# Patient Record
Sex: MALE | Race: OTHER | HISPANIC OR LATINO
[De-identification: names, ages, dates, MRNs, and addresses within clinical notes are randomized per-mention and may not be internally consistent; named-entity substitution may affect disease eponyms.]

---

## 2017-05-11 ENCOUNTER — RX RENEWAL (OUTPATIENT)
Age: 37
End: 2017-05-11

## 2018-06-25 ENCOUNTER — NON-APPOINTMENT (OUTPATIENT)
Age: 38
End: 2018-06-25

## 2018-06-25 ENCOUNTER — APPOINTMENT (OUTPATIENT)
Dept: FAMILY MEDICINE | Facility: CLINIC | Age: 38
End: 2018-06-25
Payer: COMMERCIAL

## 2018-06-25 ENCOUNTER — LABORATORY RESULT (OUTPATIENT)
Age: 38
End: 2018-06-25

## 2018-06-25 VITALS
HEART RATE: 73 BPM | WEIGHT: 245 LBS | SYSTOLIC BLOOD PRESSURE: 128 MMHG | OXYGEN SATURATION: 99 % | HEIGHT: 76 IN | DIASTOLIC BLOOD PRESSURE: 86 MMHG | TEMPERATURE: 98.2 F | BODY MASS INDEX: 29.83 KG/M2

## 2018-06-25 DIAGNOSIS — R53.83 OTHER FATIGUE: ICD-10-CM

## 2018-06-25 PROCEDURE — 36415 COLL VENOUS BLD VENIPUNCTURE: CPT

## 2018-06-25 PROCEDURE — 99214 OFFICE O/P EST MOD 30 MIN: CPT | Mod: 25

## 2018-06-26 ENCOUNTER — TRANSCRIPTION ENCOUNTER (OUTPATIENT)
Age: 38
End: 2018-06-26

## 2018-06-27 LAB
25(OH)D3 SERPL-MCNC: 26.1 NG/ML
A PHAGOCYTOPH IGG TITR SER IF: NORMAL TITER
ALBUMIN SERPL ELPH-MCNC: 5.2 G/DL
ALP BLD-CCNC: 57 U/L
ALT SERPL-CCNC: 41 U/L
ANION GAP SERPL CALC-SCNC: 15 MMOL/L
AST SERPL-CCNC: 29 U/L
B BURGDOR AB SER QL IA: NEGATIVE
B MICROTI IGG TITR SER: NORMAL TITER
BASOPHILS # BLD AUTO: 0.02 K/UL
BASOPHILS NFR BLD AUTO: 0.5 %
BILIRUB SERPL-MCNC: 0.5 MG/DL
BUN SERPL-MCNC: 24 MG/DL
CALCIUM SERPL-MCNC: 10.4 MG/DL
CHLORIDE SERPL-SCNC: 105 MMOL/L
CO2 SERPL-SCNC: 23 MMOL/L
CREAT SERPL-MCNC: 1.36 MG/DL
E CHAFFEENSIS IGG TITR SER IF: NORMAL TITER
EOSINOPHIL # BLD AUTO: 0.1 K/UL
EOSINOPHIL NFR BLD AUTO: 2.3 %
GLUCOSE SERPL-MCNC: 102 MG/DL
HCT VFR BLD CALC: 49.8 %
HGB BLD-MCNC: 16 G/DL
IMM GRANULOCYTES NFR BLD AUTO: 0.5 %
LYMPHOCYTES # BLD AUTO: 1.61 K/UL
LYMPHOCYTES NFR BLD AUTO: 36.3 %
MAN DIFF?: NORMAL
MCHC RBC-ENTMCNC: 29.5 PG
MCHC RBC-ENTMCNC: 32.1 GM/DL
MCV RBC AUTO: 91.7 FL
MONOCYTES # BLD AUTO: 0.29 K/UL
MONOCYTES NFR BLD AUTO: 6.5 %
NEUTROPHILS # BLD AUTO: 2.39 K/UL
NEUTROPHILS NFR BLD AUTO: 53.9 %
PLATELET # BLD AUTO: 214 K/UL
POTASSIUM SERPL-SCNC: 4.9 MMOL/L
PROT SERPL-MCNC: 7.9 G/DL
RBC # BLD: 5.43 M/UL
RBC # FLD: 14.7 %
SODIUM SERPL-SCNC: 143 MMOL/L
TSH SERPL-ACNC: 1.57 UIU/ML
WBC # FLD AUTO: 4.43 K/UL

## 2018-07-03 LAB

## 2018-10-04 ENCOUNTER — APPOINTMENT (OUTPATIENT)
Dept: FAMILY MEDICINE | Facility: CLINIC | Age: 38
End: 2018-10-04
Payer: COMMERCIAL

## 2018-10-04 VITALS — SYSTOLIC BLOOD PRESSURE: 120 MMHG | DIASTOLIC BLOOD PRESSURE: 80 MMHG

## 2018-10-04 VITALS
BODY MASS INDEX: 30.44 KG/M2 | OXYGEN SATURATION: 98 % | SYSTOLIC BLOOD PRESSURE: 134 MMHG | HEART RATE: 80 BPM | DIASTOLIC BLOOD PRESSURE: 84 MMHG | HEIGHT: 76 IN | WEIGHT: 250 LBS

## 2018-10-04 PROCEDURE — 99395 PREV VISIT EST AGE 18-39: CPT

## 2018-10-04 RX ORDER — DOXYCYCLINE HYCLATE 100 MG/1
100 CAPSULE ORAL
Qty: 2 | Refills: 0 | Status: DISCONTINUED | COMMUNITY
Start: 2018-06-25 | End: 2018-10-04

## 2018-10-07 NOTE — HISTORY OF PRESENT ILLNESS
[FreeTextEntry1] : Patient here for CPE.  [de-identified] : 37 year old male to establish care\par fishes night\par has muliple tick bites\par  2 kids 15 and 14\par \par takes omeprazole as needed\par very rarely\par \par

## 2018-11-30 ENCOUNTER — MEDICATION RENEWAL (OUTPATIENT)
Age: 38
End: 2018-11-30

## 2018-11-30 ENCOUNTER — TRANSCRIPTION ENCOUNTER (OUTPATIENT)
Age: 38
End: 2018-11-30

## 2019-05-04 ENCOUNTER — TRANSCRIPTION ENCOUNTER (OUTPATIENT)
Age: 39
End: 2019-05-04

## 2020-05-01 ENCOUNTER — RX RENEWAL (OUTPATIENT)
Age: 40
End: 2020-05-01

## 2021-01-12 ENCOUNTER — APPOINTMENT (OUTPATIENT)
Dept: FAMILY MEDICINE | Facility: CLINIC | Age: 41
End: 2021-01-12
Payer: COMMERCIAL

## 2021-01-12 ENCOUNTER — NON-APPOINTMENT (OUTPATIENT)
Age: 41
End: 2021-01-12

## 2021-01-12 VITALS
HEART RATE: 90 BPM | BODY MASS INDEX: 30.44 KG/M2 | OXYGEN SATURATION: 98 % | SYSTOLIC BLOOD PRESSURE: 132 MMHG | WEIGHT: 250 LBS | TEMPERATURE: 97.8 F | HEIGHT: 76 IN | DIASTOLIC BLOOD PRESSURE: 88 MMHG

## 2021-01-12 VITALS — SYSTOLIC BLOOD PRESSURE: 124 MMHG | DIASTOLIC BLOOD PRESSURE: 84 MMHG
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
## 2021-01-12 DIAGNOSIS — G25.81 RESTLESS LEGS SYNDROME: ICD-10-CM

## 2021-01-12 DIAGNOSIS — Z13.89 ENCOUNTER FOR SCREENING FOR OTHER DISORDER: ICD-10-CM

## 2021-01-12 DIAGNOSIS — R06.83 SNORING: ICD-10-CM

## 2021-01-12 DIAGNOSIS — Z13.29 ENCOUNTER FOR SCREENING FOR OTHER SUSPECTED ENDOCRINE DISORDER: ICD-10-CM

## 2021-01-12 DIAGNOSIS — R21 RASH AND OTHER NONSPECIFIC SKIN ERUPTION: ICD-10-CM

## 2021-01-12 DIAGNOSIS — R74.8 ABNORMAL LEVELS OF OTHER SERUM ENZYMES: ICD-10-CM

## 2021-01-12 DIAGNOSIS — M25.50 PAIN IN UNSPECIFIED JOINT: ICD-10-CM

## 2021-01-12 DIAGNOSIS — Z12.5 ENCOUNTER FOR SCREENING FOR MALIGNANT NEOPLASM OF PROSTATE: ICD-10-CM

## 2021-01-12 PROCEDURE — 99072 ADDL SUPL MATRL&STAF TM PHE: CPT

## 2021-01-12 PROCEDURE — 93000 ELECTROCARDIOGRAM COMPLETE: CPT | Mod: 59

## 2021-01-12 PROCEDURE — 99396 PREV VISIT EST AGE 40-64: CPT | Mod: 25

## 2021-01-12 RX ORDER — DOXYCYCLINE HYCLATE 100 MG/1
100 CAPSULE ORAL
Qty: 2 | Refills: 3 | Status: COMPLETED | COMMUNITY
Start: 2018-10-04 | End: 2021-01-12

## 2021-01-12 NOTE — HISTORY OF PRESENT ILLNESS
[FreeTextEntry1] : CPE [de-identified] : MITCH is a 40 year male here for CPE. Mood is good. Takes Omeprazole prn. States he was taking Vitamin D 5,000 units. Notes he has been improving on increasing his water intake to 60 oz a day. Reports that his wife thinks he has restless leg syndrome because he moves around a lot. He states it is because they have four cats that walk all over him while he is sleeping. Notes that he is a snorer. Denies fatigue. Bowel habits are normal.

## 2021-01-12 NOTE — END OF VISIT
[FreeTextEntry3] : Medical record entries made by the scribe today today, were at my direction and personally dictated to them by me, Dr. Devorah Virgen on Jan 12, 2021. I have reviewed the chart and agree that the record accurately reflects my personal performance of the history, physical exam, assessment, and plan.\par

## 2021-01-12 NOTE — HEALTH RISK ASSESSMENT
[Good] : ~his/her~  mood as  good [No] : In the past 12 months have you used drugs other than those required for medical reasons? No [0] : 2) Feeling down, depressed, or hopeless: Not at all (0) [None] : None [With Family] : lives with family [Employed] : employed [] :  [Feels Safe at Home] : Feels safe at home [] : No [[id number]] : 0

## 2021-01-12 NOTE — ADDENDUM
[FreeTextEntry1] : I, Brynn Carolina acting as a scribe for Dr. Devorah Virgen on Jan 12, 2021  at 4:05 PM\par

## 2021-01-12 NOTE — PLAN
[FreeTextEntry1] : \par - Blood work script \par - Blood work from 2018 reviewed \par - Low Vitamin D in 2018- Will check labs. If Vit D low will recommend ergocalciferol 50,000 units once a week \par - Last cholesterol in 2018 was 214- Encouraged 150 min of exercise a week. I adv low fat/low cholesterol diet and weight loss. \par - Elevated Creatine Kinase: Increase hydration at least 70 oz!!!\par - Sleep study referral provided \par - F/u 1 year

## 2021-02-13 DIAGNOSIS — Z01.818 ENCOUNTER FOR OTHER PREPROCEDURAL EXAMINATION: ICD-10-CM

## 2021-02-14 ENCOUNTER — APPOINTMENT (OUTPATIENT)
Dept: DISASTER EMERGENCY | Facility: CLINIC | Age: 41
End: 2021-02-14

## 2021-02-23 ENCOUNTER — APPOINTMENT (OUTPATIENT)
Dept: DISASTER EMERGENCY | Facility: CLINIC | Age: 41
End: 2021-02-23

## 2021-02-24 ENCOUNTER — APPOINTMENT (OUTPATIENT)
Dept: DISASTER EMERGENCY | Facility: CLINIC | Age: 41
End: 2021-02-24

## 2021-02-25 LAB — SARS-COV-2 N GENE NPH QL NAA+PROBE: NOT DETECTED

## 2021-02-28 ENCOUNTER — OUTPATIENT (OUTPATIENT)
Dept: OUTPATIENT SERVICES | Facility: HOSPITAL | Age: 41
LOS: 1 days | End: 2021-02-28
Payer: COMMERCIAL

## 2021-02-28 DIAGNOSIS — G47.33 OBSTRUCTIVE SLEEP APNEA (ADULT) (PEDIATRIC): ICD-10-CM

## 2021-02-28 PROCEDURE — 95810 POLYSOM 6/> YRS 4/> PARAM: CPT | Mod: 26

## 2021-02-28 PROCEDURE — 95810 POLYSOM 6/> YRS 4/> PARAM: CPT

## 2021-03-08 ENCOUNTER — TRANSCRIPTION ENCOUNTER (OUTPATIENT)
Age: 41
End: 2021-03-08

## 2021-03-23 ENCOUNTER — APPOINTMENT (OUTPATIENT)
Dept: DISASTER EMERGENCY | Facility: CLINIC | Age: 41
End: 2021-03-23

## 2021-03-24 LAB — SARS-COV-2 N GENE NPH QL NAA+PROBE: NOT DETECTED

## 2021-03-27 ENCOUNTER — OUTPATIENT (OUTPATIENT)
Dept: OUTPATIENT SERVICES | Facility: HOSPITAL | Age: 41
LOS: 1 days | End: 2021-03-27
Payer: COMMERCIAL

## 2021-03-27 DIAGNOSIS — G47.33 OBSTRUCTIVE SLEEP APNEA (ADULT) (PEDIATRIC): ICD-10-CM

## 2021-03-27 PROCEDURE — 95811 POLYSOM 6/>YRS CPAP 4/> PARM: CPT | Mod: 26

## 2021-03-27 PROCEDURE — 95811 POLYSOM 6/>YRS CPAP 4/> PARM: CPT

## 2021-12-23 ENCOUNTER — APPOINTMENT (OUTPATIENT)
Dept: FAMILY MEDICINE | Facility: CLINIC | Age: 41
End: 2021-12-23
Payer: COMMERCIAL

## 2021-12-23 PROCEDURE — 99213 OFFICE O/P EST LOW 20 MIN: CPT | Mod: 95

## 2021-12-23 NOTE — HISTORY OF PRESENT ILLNESS
[Home] : at home, [unfilled] , at the time of the visit. [Medical Office: ([facility])___] : at the medical office located in  [Other:____] : [unfilled] [Verbal consent obtained from patient] : the patient, [unfilled] [FreeTextEntry1] : follow up  [de-identified] : Mr. MITCH BOWERS is a 41 year old male presenting today for a telehealth consultation via phone/computer. Verbal consent was obtained prior to visit. Patient presents today regarding medication management. Mood is good, appears well. At this time patient states that he is in need of a renewal for Omeprazole. \par \par At this time patient states he is still using his CPAP machine. Has been using since April 2021, after Obstructive Sleep Apnea (VERA) diagnosis in lieu of a sleep study ordered by the physician. Patient indicates that he is in need of a new script for his CPAP mask, because the mask that goes over the nose does not work since the patient sleeps with his mouth open. \par \par Additionally, patient states that he obtained the Pfizer Covid-19 vaccination.

## 2021-12-23 NOTE — ASSESSMENT
[FreeTextEntry1] : ASSESSMENT: Mr. MITCH BOWERS  is a 41 year  old female presenting to the clinic today via telehealth for a follow up regarding medication management. and follow up.\par \par # Discussed in depth patient's CPAP machine\par - in need of new mask\par - compliant with every night use\par \par # Reviewed medication needs:\par - refill needed for Omeprazole

## 2021-12-23 NOTE — END OF VISIT
[Time Spent: ___ minutes] : I have spent [unfilled] minutes of time on the encounter. [FreeTextEntry2] : This note was written by ROLF CABALLERO on 12/23/2021 , acting solely as a scribe for Dr. Devorah Virgen MD. \par \par All medical record entries made by the scribe, ROLF CABALLERO, were at my, Dr. Devorah Virgen MD, direction and personally dictated by me on 12/23/2021 . I have personally reviewed the chart and agree that the record accurately reflects my personal performance and care.

## 2021-12-23 NOTE — PHYSICAL EXAM
[No Acute Distress] : no acute distress [Well Nourished] : well nourished [Well Developed] : well developed [Well-Appearing] : well-appearing [Normal Sclera/Conjunctiva] : normal sclera/conjunctiva [No Respiratory Distress] : no respiratory distress  [No Rash] : no rash [Coordination Grossly Intact] : coordination grossly intact [No Focal Deficits] : no focal deficits [Normal Gait] : normal gait [Deep Tendon Reflexes (DTR)] : deep tendon reflexes were 2+ and symmetric [Normal Affect] : the affect was normal [Normal Insight/Judgement] : insight and judgment were intact [Normal Outer Ear/Nose] : the outer ears and nose were normal in appearance [Speech Grossly Normal] : speech grossly normal [Normal Mood] : the mood was normal [Normal] : affect was normal and insight and judgment were intact

## 2021-12-23 NOTE — PLAN
[FreeTextEntry1] : # RENEW for GERD: Omeprazole 40 MG 1x day\par - advised patient against consumption of sodas in lieu of high acidity and sugar content\par \par # Ordered patient new mask for CPAP\par \par # Follow up prn\par

## 2022-01-12 ENCOUNTER — TRANSCRIPTION ENCOUNTER (OUTPATIENT)
Age: 42
End: 2022-01-12

## 2022-03-22 ENCOUNTER — NON-APPOINTMENT (OUTPATIENT)
Age: 42
End: 2022-03-22

## 2022-03-22 ENCOUNTER — APPOINTMENT (OUTPATIENT)
Dept: FAMILY MEDICINE | Facility: CLINIC | Age: 42
End: 2022-03-22
Payer: COMMERCIAL

## 2022-03-22 VITALS
SYSTOLIC BLOOD PRESSURE: 118 MMHG | DIASTOLIC BLOOD PRESSURE: 78 MMHG | HEIGHT: 76 IN | OXYGEN SATURATION: 98 % | BODY MASS INDEX: 30.08 KG/M2 | WEIGHT: 247 LBS | TEMPERATURE: 97.7 F | HEART RATE: 92 BPM

## 2022-03-22 DIAGNOSIS — G47.33 OBSTRUCTIVE SLEEP APNEA (ADULT) (PEDIATRIC): ICD-10-CM

## 2022-03-22 DIAGNOSIS — R05.3 CHRONIC COUGH: ICD-10-CM

## 2022-03-22 DIAGNOSIS — M25.50 PAIN IN UNSPECIFIED JOINT: ICD-10-CM

## 2022-03-22 PROCEDURE — 99396 PREV VISIT EST AGE 40-64: CPT

## 2022-03-22 NOTE — HISTORY OF PRESENT ILLNESS
[FreeTextEntry1] : CPE [de-identified] : Nigel is a 41 year male here today for CPE. Patient's mood is good and overall health seems to be well. Pt reported that he has a chronic cough that occurs in the evening everyday and lasts for an hour. Pt suspects the second COVID-19 vaccination dosage in December 2021 may have caused his cough. Pt reports he does not sleep with CPAP anymore because he cannot tolerate the nosepiece and insurance has not covered his face mask. Pt says he has had success with the face mask in the past. Pt has hx of +DWAYNE, CRP and followed up and was cleared by rheumatology. Pt also followed up with urologist about abnormal PSA, cleared by urology would like to repeat today. \par \par Pt denies any CP, palpitations, SOB, BRANDON, fevers, chills, abdominal pain, nausea, or vomiting.\par

## 2022-03-22 NOTE — END OF VISIT
[FreeTextEntry3] : Medical record entries made by the scribe today today, were at my direction and personally dictated to them by me, Dr. Devorah Virgen on March 22, 2022. I have reviewed the chart and agree that the record accurately reflects my personal performance of the history, physical exam, assessment, and plan.\par

## 2022-03-22 NOTE — HEALTH RISK ASSESSMENT
[Good] : ~his/her~  mood as  good [None] : None [Employed] : employed [] :  [Feels Safe at Home] : Feels safe at home [Fully functional (bathing, dressing, toileting, transferring, walking, feeding)] : Fully functional (bathing, dressing, toileting, transferring, walking, feeding) [Fully functional (using the telephone, shopping, preparing meals, housekeeping, doing laundry, using] : Fully functional and needs no help or supervision to perform IADLs (using the telephone, shopping, preparing meals, housekeeping, doing laundry, using transportation, managing medications and managing finances)

## 2022-03-22 NOTE — ADDENDUM
[FreeTextEntry1] : I, Jamil Santiago acting as a scribe for Dr. Devorah Virgen on March 22, 2022.\par

## 2022-03-22 NOTE — PHYSICAL EXAM
[No Acute Distress] : no acute distress [Well Nourished] : well nourished [Well Developed] : well developed [Well-Appearing] : well-appearing [Normal Sclera/Conjunctiva] : normal sclera/conjunctiva [PERRL] : pupils equal round and reactive to light [EOMI] : extraocular movements intact [Normal Outer Ear/Nose] : the outer ears and nose were normal in appearance [Normal Oropharynx] : the oropharynx was normal [No JVD] : no jugular venous distention [No Lymphadenopathy] : no lymphadenopathy [Supple] : supple [Thyroid Normal, No Nodules] : the thyroid was normal and there were no nodules present [No Respiratory Distress] : no respiratory distress  [No Accessory Muscle Use] : no accessory muscle use [Normal Rate] : normal rate  [Clear to Auscultation] : lungs were clear to auscultation bilaterally [Regular Rhythm] : with a regular rhythm [Normal S1, S2] : normal S1 and S2 [No Murmur] : no murmur heard [No Abdominal Bruit] : a ~M bruit was not heard ~T in the abdomen [No Carotid Bruits] : no carotid bruits [No Varicosities] : no varicosities [Pedal Pulses Present] : the pedal pulses are present [No Edema] : there was no peripheral edema [No Palpable Aorta] : no palpable aorta [No Extremity Clubbing/Cyanosis] : no extremity clubbing/cyanosis [Soft] : abdomen soft [Non Tender] : non-tender [Non-distended] : non-distended [No Masses] : no abdominal mass palpated [No HSM] : no HSM [Normal Bowel Sounds] : normal bowel sounds [Normal Posterior Cervical Nodes] : no posterior cervical lymphadenopathy [Normal Anterior Cervical Nodes] : no anterior cervical lymphadenopathy [No CVA Tenderness] : no CVA  tenderness [No Spinal Tenderness] : no spinal tenderness [No Joint Swelling] : no joint swelling [Grossly Normal Strength/Tone] : grossly normal strength/tone [No Rash] : no rash [Coordination Grossly Intact] : coordination grossly intact [No Focal Deficits] : no focal deficits [Normal Gait] : normal gait [Deep Tendon Reflexes (DTR)] : deep tendon reflexes were 2+ and symmetric [Normal Affect] : the affect was normal [Normal Insight/Judgement] : insight and judgment were intact

## 2022-03-23 DIAGNOSIS — Z01.84 ENCOUNTER FOR ANTIBODY RESPONSE EXAMINATION: ICD-10-CM

## 2022-05-31 ENCOUNTER — APPOINTMENT (OUTPATIENT)
Dept: ORTHOPEDIC SURGERY | Facility: CLINIC | Age: 42
End: 2022-05-31
Payer: COMMERCIAL

## 2022-05-31 DIAGNOSIS — E78.00 PURE HYPERCHOLESTEROLEMIA, UNSPECIFIED: ICD-10-CM

## 2022-05-31 DIAGNOSIS — S39.012A STRAIN OF MUSCLE, FASCIA AND TENDON OF LOWER BACK, INITIAL ENCOUNTER: ICD-10-CM

## 2022-05-31 PROCEDURE — 99204 OFFICE O/P NEW MOD 45 MIN: CPT

## 2022-05-31 PROCEDURE — 72100 X-RAY EXAM L-S SPINE 2/3 VWS: CPT

## 2022-05-31 RX ORDER — CYCLOBENZAPRINE HYDROCHLORIDE 10 MG/1
10 TABLET, FILM COATED ORAL 3 TIMES DAILY
Qty: 60 | Refills: 1 | Status: ACTIVE | COMMUNITY
Start: 2022-05-31 | End: 1900-01-01

## 2022-05-31 NOTE — PHYSICAL EXAM
[] : no ecchymosis [Disc space narrowing] : Disc space narrowing [AP] : anteroposterior [There are no fractures, subluxations or dislocations. No significant abnormalities are seen] : There are no fractures, subluxations or dislocations. No significant abnormalities are seen [FreeTextEntry1] : L5-S1>L4-L5 narrowing

## 2022-05-31 NOTE — DISCUSSION/SUMMARY
[de-identified] : The patient was advised of the diagnosis. The natural history of the pathology was explained in full to the patient in layman's terms. All questions were answered. The risks and benefits of surgical and non-surgical treatment alternatives were explained in full to the patient.\par \par I discussed timing and frequency of the medication with the patient.

## 2022-05-31 NOTE — HISTORY OF PRESENT ILLNESS
[de-identified] : 42 y/o male c/o low back pain that started yesterday. No specific injury, but had been doing yard work earlier in the day. He was leaning forward, ironing, and felt a sharp pain in the lower back. Describes pain and tightness across the lower back (L>R) without radiation. Worse with activity. Tried ice/heat and OTC meds without relief. Denies history of prior injury.

## 2022-08-25 ENCOUNTER — RX RENEWAL (OUTPATIENT)
Age: 42
End: 2022-08-25

## 2023-02-05 ENCOUNTER — TRANSCRIPTION ENCOUNTER (OUTPATIENT)
Age: 43
End: 2023-02-05

## 2023-05-15 ENCOUNTER — RX RENEWAL (OUTPATIENT)
Age: 43
End: 2023-05-15

## 2023-06-09 ENCOUNTER — APPOINTMENT (OUTPATIENT)
Dept: FAMILY MEDICINE | Facility: CLINIC | Age: 43
End: 2023-06-09
Payer: COMMERCIAL

## 2023-06-09 ENCOUNTER — NON-APPOINTMENT (OUTPATIENT)
Age: 43
End: 2023-06-09

## 2023-06-09 VITALS
HEART RATE: 89 BPM | OXYGEN SATURATION: 98 % | HEIGHT: 76 IN | WEIGHT: 242 LBS | SYSTOLIC BLOOD PRESSURE: 120 MMHG | BODY MASS INDEX: 29.47 KG/M2 | DIASTOLIC BLOOD PRESSURE: 72 MMHG

## 2023-06-09 DIAGNOSIS — Z86.19 PERSONAL HISTORY OF OTHER INFECTIOUS AND PARASITIC DISEASES: ICD-10-CM

## 2023-06-09 DIAGNOSIS — E55.9 VITAMIN D DEFICIENCY, UNSPECIFIED: ICD-10-CM

## 2023-06-09 DIAGNOSIS — Z82.49 FAMILY HISTORY OF ISCHEMIC HEART DISEASE AND OTHER DISEASES OF THE CIRCULATORY SYSTEM: ICD-10-CM

## 2023-06-09 DIAGNOSIS — R76.8 OTHER SPECIFIED ABNORMAL IMMUNOLOGICAL FINDINGS IN SERUM: ICD-10-CM

## 2023-06-09 PROCEDURE — 93000 ELECTROCARDIOGRAM COMPLETE: CPT

## 2023-06-09 PROCEDURE — 99396 PREV VISIT EST AGE 40-64: CPT | Mod: 25

## 2023-06-09 RX ORDER — DOXYCYCLINE 100 MG/1
100 CAPSULE ORAL
Qty: 2 | Refills: 3 | Status: ACTIVE | COMMUNITY
Start: 2023-06-09 | End: 1900-01-01

## 2023-06-09 NOTE — PLAN
[FreeTextEntry1] : BW drawn today\par Encourage 150 minutes of physical activity a week\par Encouraged well balanced diet, low carbohydrates/fatty foods\par follow up 1 year or sooner if needed\par

## 2023-06-09 NOTE — HISTORY OF PRESENT ILLNESS
[FreeTextEntry1] : CPE [de-identified] : 41yo M presents for CPE. Pt reports no acute issues today. Pt reports he would like to monitor his PSA and autoimmune labs. Pt states his daughter is currently struggling with undiagnosed medical issues. Pt reports he is sleeping well, active but not exercises and eats generally well. Pt reports his father had stents placed due to CAD. Pt reports no other family hx of heart disease. Pt denies any colon cancer in his family. Pt reports he is sleeping through the night, urinating normally. \par Pt denies any CP, palpitations, SOB, BRANDON, fevers, chills, abdominal pain, N/V, diarrhea, constipation, BRBPR or dark tarry stools.\par

## 2023-06-09 NOTE — HEALTH RISK ASSESSMENT
[Good] : ~his/her~  mood as  good [Yes] : Yes [Monthly or less (1 pt)] : Monthly or less (1 point) [1 or 2 (0 pts)] : 1 or 2 (0 points) [Never (0 pts)] : Never (0 points) [No] : In the past 12 months have you used drugs other than those required for medical reasons? No [0] : 2) Feeling down, depressed, or hopeless: Not at all (0) [PHQ-2 Negative - No further assessment needed] : PHQ-2 Negative - No further assessment needed [None] : None [With Family] : lives with family [Employed] : employed [] :  [Feels Safe at Home] : Feels safe at home [Fully functional (bathing, dressing, toileting, transferring, walking, feeding)] : Fully functional (bathing, dressing, toileting, transferring, walking, feeding) [Fully functional (using the telephone, shopping, preparing meals, housekeeping, doing laundry, using] : Fully functional and needs no help or supervision to perform IADLs (using the telephone, shopping, preparing meals, housekeeping, doing laundry, using transportation, managing medications and managing finances) [# Of Children ___] : has [unfilled] children [Never] : Never [Audit-CScore] : 1 [de-identified] : doing yard work, nothing consistent  [de-identified] : inconsistent  [[id number]] : 0

## 2023-08-24 ENCOUNTER — TRANSCRIPTION ENCOUNTER (OUTPATIENT)
Age: 43
End: 2023-08-24

## 2023-09-15 ENCOUNTER — NON-APPOINTMENT (OUTPATIENT)
Age: 43
End: 2023-09-15

## 2023-09-16 ENCOUNTER — TRANSCRIPTION ENCOUNTER (OUTPATIENT)
Age: 43
End: 2023-09-16

## 2023-11-20 ENCOUNTER — TRANSCRIPTION ENCOUNTER (OUTPATIENT)
Age: 43
End: 2023-11-20

## 2024-02-18 ENCOUNTER — TRANSCRIPTION ENCOUNTER (OUTPATIENT)
Age: 44
End: 2024-02-18

## 2024-04-12 ENCOUNTER — NON-APPOINTMENT (OUTPATIENT)
Age: 44
End: 2024-04-12

## 2024-04-12 ENCOUNTER — APPOINTMENT (OUTPATIENT)
Dept: FAMILY MEDICINE | Facility: CLINIC | Age: 44
End: 2024-04-12
Payer: COMMERCIAL

## 2024-04-12 VITALS
BODY MASS INDEX: 29.96 KG/M2 | SYSTOLIC BLOOD PRESSURE: 130 MMHG | DIASTOLIC BLOOD PRESSURE: 80 MMHG | WEIGHT: 246 LBS | OXYGEN SATURATION: 98 % | HEART RATE: 88 BPM | HEIGHT: 76 IN

## 2024-04-12 VITALS — DIASTOLIC BLOOD PRESSURE: 80 MMHG | SYSTOLIC BLOOD PRESSURE: 130 MMHG

## 2024-04-12 DIAGNOSIS — Z00.00 ENCOUNTER FOR GENERAL ADULT MEDICAL EXAMINATION W/OUT ABNORMAL FINDINGS: ICD-10-CM

## 2024-04-12 DIAGNOSIS — M54.50 LOW BACK PAIN, UNSPECIFIED: ICD-10-CM

## 2024-04-12 DIAGNOSIS — K21.9 GASTRO-ESOPHAGEAL REFLUX DISEASE W/OUT ESOPHAGITIS: ICD-10-CM

## 2024-04-12 PROCEDURE — 99396 PREV VISIT EST AGE 40-64: CPT

## 2024-04-12 PROCEDURE — 93000 ELECTROCARDIOGRAM COMPLETE: CPT

## 2024-04-12 RX ORDER — DICLOFENAC SODIUM 75 MG/1
75 TABLET, DELAYED RELEASE ORAL
Qty: 60 | Refills: 1 | Status: ACTIVE | COMMUNITY
Start: 2022-05-31 | End: 1900-01-01

## 2024-04-12 NOTE — ADDENDUM
[FreeTextEntry1] : This note was written by Venecia Travis, acting as the  for Dr. Virgen. This note accurately reflects the work and decisions made by Dr. Virgen.

## 2024-04-12 NOTE — HEALTH RISK ASSESSMENT
[Good] : ~his/her~  mood as  good [Yes] : Yes [Monthly or less (1 pt)] : Monthly or less (1 point) [1 or 2 (0 pts)] : 1 or 2 (0 points) [Never (0 pts)] : Never (0 points) [No] : In the past 12 months have you used drugs other than those required for medical reasons? No [No falls in past year] : Patient reported no falls in the past year [0] : 2) Feeling down, depressed, or hopeless: Not at all (0) [PHQ-2 Negative - No further assessment needed] : PHQ-2 Negative - No further assessment needed [None] : None [With Family] : lives with family [Employed] : employed [] :  [Feels Safe at Home] : Feels safe at home [Fully functional (bathing, dressing, toileting, transferring, walking, feeding)] : Fully functional (bathing, dressing, toileting, transferring, walking, feeding) [Fully functional (using the telephone, shopping, preparing meals, housekeeping, doing laundry, using] : Fully functional and needs no help or supervision to perform IADLs (using the telephone, shopping, preparing meals, housekeeping, doing laundry, using transportation, managing medications and managing finances) [Never] : Never [Audit-CScore] : 1 [de-identified] : Will usually eat one meal a day.  [[id number]] : 0 [Change in mental status noted] : No change in mental status noted

## 2024-04-12 NOTE — PLAN
[FreeTextEntry1] : -General bloodwork done.  -Recommended he add a protein shake into his diet for lunch. -Recommended he follow-up ASAP if the pain starts to radiate down his leg.  -Discussed a colonoscopy screening for colon cancer he has been advised to go when he turns 44.  -Advised the patient to go for a CT calcium score, informed the patient that due to his family history as well as his elevated cholesterol levels puts him at a higher risk of heart disease in the future. Informed the patient that the exam is not covered by insurance and that it is designed to measure the calcified plaque build-up in his coronary arteries. Will further discuss next year. -Will start the patient on Diclofenac gel 75mg to take as needed.  -Will follow-up with the patient in 1 year or as needed.

## 2024-04-12 NOTE — HISTORY OF PRESENT ILLNESS
[FreeTextEntry1] : CPE [de-identified] : A 43-year-old male presents today for a CPE. Mood is good. Pt is currently taking Omeprazole 40mg as needed. Pt c/o occasional pain in his lower back, states it does not radiate down his leg. He has been using Aleve as needed to treat the pain, he had followed up with an orthopedist 2 years ago with intense pain, had been given diclofenac gel which was effective. Pts father has had stents inserted, started in his mid 60s. is unsure of any other family history.

## 2024-04-16 LAB
25(OH)D3 SERPL-MCNC: 35.3 NG/ML
ALBUMIN SERPL ELPH-MCNC: 5.3 G/DL
ALP BLD-CCNC: 69 U/L
ALT SERPL-CCNC: 32 U/L
ANION GAP SERPL CALC-SCNC: 19 MMOL/L
APPEARANCE: CLEAR
AST SERPL-CCNC: 23 U/L
BACTERIA: NEGATIVE /HPF
BASOPHILS # BLD AUTO: 0.03 K/UL
BASOPHILS NFR BLD AUTO: 0.6 %
BILIRUB SERPL-MCNC: 0.5 MG/DL
BILIRUBIN URINE: NEGATIVE
BLOOD URINE: NEGATIVE
BUN SERPL-MCNC: 17 MG/DL
CALCIUM SERPL-MCNC: 10.4 MG/DL
CAST: 0 /LPF
CHLORIDE SERPL-SCNC: 101 MMOL/L
CHOLEST SERPL-MCNC: 227 MG/DL
CO2 SERPL-SCNC: 22 MMOL/L
COLOR: YELLOW
CREAT SERPL-MCNC: 1.25 MG/DL
EGFR: 73 ML/MIN/1.73M2
EOSINOPHIL # BLD AUTO: 0.07 K/UL
EOSINOPHIL NFR BLD AUTO: 1.3 %
EPITHELIAL CELLS: 0 /HPF
ESTIMATED AVERAGE GLUCOSE: 120 MG/DL
GLUCOSE QUALITATIVE U: NEGATIVE MG/DL
GLUCOSE SERPL-MCNC: 107 MG/DL
HBA1C MFR BLD HPLC: 5.8 %
HCT VFR BLD CALC: 49.4 %
HDLC SERPL-MCNC: 33 MG/DL
HGB BLD-MCNC: 16.3 G/DL
IMM GRANULOCYTES NFR BLD AUTO: 0.4 %
KETONES URINE: NEGATIVE MG/DL
LDLC SERPL CALC-MCNC: 152 MG/DL
LEUKOCYTE ESTERASE URINE: NEGATIVE
LYMPHOCYTES # BLD AUTO: 1.8 K/UL
LYMPHOCYTES NFR BLD AUTO: 34.7 %
MAN DIFF?: NORMAL
MCHC RBC-ENTMCNC: 29.5 PG
MCHC RBC-ENTMCNC: 33 GM/DL
MCV RBC AUTO: 89.3 FL
MICROSCOPIC-UA: NORMAL
MONOCYTES # BLD AUTO: 0.41 K/UL
MONOCYTES NFR BLD AUTO: 7.9 %
NEUTROPHILS # BLD AUTO: 2.86 K/UL
NEUTROPHILS NFR BLD AUTO: 55.1 %
NITRITE URINE: NEGATIVE
NONHDLC SERPL-MCNC: 194 MG/DL
PH URINE: 6
PLATELET # BLD AUTO: 223 K/UL
POTASSIUM SERPL-SCNC: 5 MMOL/L
PROT SERPL-MCNC: 7.7 G/DL
PROTEIN URINE: NEGATIVE MG/DL
PSA FREE FLD-MCNC: 37 %
PSA FREE SERPL-MCNC: 0.3 NG/ML
PSA SERPL-MCNC: 0.81 NG/ML
RBC # BLD: 5.53 M/UL
RBC # FLD: 13.8 %
RED BLOOD CELLS URINE: 0 /HPF
SODIUM SERPL-SCNC: 142 MMOL/L
SPECIFIC GRAVITY URINE: 1.01
TRIGL SERPL-MCNC: 230 MG/DL
TSH SERPL-ACNC: 1.85 UIU/ML
URATE SERPL-MCNC: 7.7 MG/DL
UROBILINOGEN URINE: 0.2 MG/DL
WBC # FLD AUTO: 5.19 K/UL
WHITE BLOOD CELLS URINE: 0 /HPF

## 2024-04-22 ENCOUNTER — TRANSCRIPTION ENCOUNTER (OUTPATIENT)
Age: 44
End: 2024-04-22

## 2024-04-24 RX ORDER — OMEGA-3/DHA/EPA/FISH OIL 300-1000MG
1000 CAPSULE ORAL DAILY
Refills: 0 | Status: ACTIVE | COMMUNITY
Start: 2024-04-24